# Patient Record
Sex: FEMALE | Race: WHITE | NOT HISPANIC OR LATINO
[De-identification: names, ages, dates, MRNs, and addresses within clinical notes are randomized per-mention and may not be internally consistent; named-entity substitution may affect disease eponyms.]

---

## 2019-05-30 ENCOUNTER — TRANSCRIPTION ENCOUNTER (OUTPATIENT)
Age: 24
End: 2019-05-30

## 2019-06-23 PROBLEM — Z00.00 ENCOUNTER FOR PREVENTIVE HEALTH EXAMINATION: Status: ACTIVE | Noted: 2019-06-23

## 2019-07-23 ENCOUNTER — APPOINTMENT (OUTPATIENT)
Dept: ENDOCRINOLOGY | Facility: CLINIC | Age: 24
End: 2019-07-23
Payer: COMMERCIAL

## 2019-07-23 ENCOUNTER — FORM ENCOUNTER (OUTPATIENT)
Age: 24
End: 2019-07-23

## 2019-07-23 VITALS
DIASTOLIC BLOOD PRESSURE: 79 MMHG | BODY MASS INDEX: 28.49 KG/M2 | HEIGHT: 68 IN | SYSTOLIC BLOOD PRESSURE: 136 MMHG | WEIGHT: 188 LBS | HEART RATE: 76 BPM

## 2019-07-23 DIAGNOSIS — Z80.3 FAMILY HISTORY OF MALIGNANT NEOPLASM OF BREAST: ICD-10-CM

## 2019-07-23 DIAGNOSIS — E04.1 NONTOXIC SINGLE THYROID NODULE: ICD-10-CM

## 2019-07-23 PROCEDURE — 99204 OFFICE O/P NEW MOD 45 MIN: CPT

## 2019-07-24 ENCOUNTER — APPOINTMENT (OUTPATIENT)
Dept: ULTRASOUND IMAGING | Facility: HOSPITAL | Age: 24
End: 2019-07-24
Payer: COMMERCIAL

## 2019-07-24 ENCOUNTER — OUTPATIENT (OUTPATIENT)
Dept: OUTPATIENT SERVICES | Facility: HOSPITAL | Age: 24
LOS: 1 days | End: 2019-07-24
Payer: COMMERCIAL

## 2019-07-24 PROCEDURE — 76536 US EXAM OF HEAD AND NECK: CPT | Mod: 26

## 2019-07-24 PROCEDURE — 76536 US EXAM OF HEAD AND NECK: CPT

## 2019-07-25 LAB
THYROGLOB AB SERPL-ACNC: <20 IU/ML
THYROPEROXIDASE AB SERPL IA-ACNC: 12.7 IU/ML
TSH SERPL-ACNC: 2.26 UIU/ML

## 2019-08-07 ENCOUNTER — FORM ENCOUNTER (OUTPATIENT)
Age: 24
End: 2019-08-07

## 2019-08-08 ENCOUNTER — APPOINTMENT (OUTPATIENT)
Dept: ULTRASOUND IMAGING | Facility: HOSPITAL | Age: 24
End: 2019-08-08
Payer: COMMERCIAL

## 2019-08-08 ENCOUNTER — OUTPATIENT (OUTPATIENT)
Dept: OUTPATIENT SERVICES | Facility: HOSPITAL | Age: 24
LOS: 1 days | End: 2019-08-08
Payer: COMMERCIAL

## 2019-08-08 ENCOUNTER — RESULT REVIEW (OUTPATIENT)
Age: 24
End: 2019-08-08

## 2019-08-08 PROCEDURE — 88173 CYTOPATH EVAL FNA REPORT: CPT

## 2019-08-08 PROCEDURE — 88305 TISSUE EXAM BY PATHOLOGIST: CPT

## 2019-08-08 PROCEDURE — 10005 FNA BX W/US GDN 1ST LES: CPT

## 2019-08-09 LAB — NON-GYNECOLOGICAL CYTOLOGY STUDY: SIGNIFICANT CHANGE UP

## 2019-08-10 NOTE — ASSESSMENT
[FreeTextEntry1] : Thyroid nodule. She is clinically euthyroid. Recent thyroid ultrasound demonstrated a left upper pole 3.5 x 2.1 x 2.8 cm complex cystic nodule meeting criteria for biopsy, although the nodule is smaller on physical examination today. We discussed that approximately 95 percent of all thyroid nodules are caused by benign conditions. We will check thyroid stimulating hormone and thyroid antibodies today. We discussed the procedure for fine needle aspiration of thyroid nodules and possible results, including further testing for atypia of undetermined significance. She will call after biopsy to discuss results.

## 2019-08-10 NOTE — HISTORY OF PRESENT ILLNESS
[FreeTextEntry1] : Ms. Riojas is a 24 year-old woman with no significant past medical history presenting for evaluation of left thyroid nodule.\par \par Left thyroid nodule.\par She noted left neck swelling and pain in May 2019. She went to urgent care and was referred for thyroid ultrasound. She was found to have a left upper pole 3.4 x 2.1 x 2.8 cm complex cystic nodule. She did not have a biopsy of this nodule. Her swelling and pain subsequently improved.\par She again had left neck swelling and pain at the beginning of the month. She was seen in an emergency department in [place] went she was visiting family. Blood tests were unremarkable per report. CT neck was performed showing a left complex cystic nodule on review of images; no report available.\par No history of radiation exposure.\par No family history of thyroid disease.\par \par No dysphagia, fixed/hard neck mass, shortness of breath. No change in weight, palpitations, diarrhea/constipation, hair/skin changes, depression/anxiety. Menses are regular.

## 2019-08-10 NOTE — DATA REVIEWED
[FreeTextEntry1] : Summarized as per HPI.\par \par Thyroid ultrasound (May 31, 2019) reviewed and significant for:\par The right lobe measures 4.5 x 1.2 x 1.7 cm. The left lobe measures 4.7 x 2.1 x 2.6 cm. The isthmus measures 0.2 cm. Right lower pole 0.4 x 0.3 x 0.4 cm cyst. Complex cystic left upper pole 3.4 x 2.1 x 2.8 cm nodule.

## 2019-08-10 NOTE — PHYSICAL EXAM
[Alert] : alert [No Acute Distress] : no acute distress [Healthy Appearance] : healthy appearance [Normal Sclera/Conjunctiva] : normal sclera/conjunctiva [Normal Oropharynx] : the oropharynx was normal [No Neck Mass] : no neck mass was observed [Supple] : the neck was supple [No LAD] : no lymphadenopathy [Normal Rate and Effort] : normal respiratory rhythm and effort [Clear to Auscultation] : lungs were clear to auscultation bilaterally [Normal Rate] : heart rate was normal  [Normal S1, S2] : normal S1 and S2 [Regular Rhythm] : with a regular rhythm [No Stigmata of Cushings Syndrome] : no stigmata of cushings syndrome [Normal Gait] : normal gait [Normal Insight/Judgement] : insight and judgment were intact [Acanthosis Nigricans] : no acanthosis nigricans [Kyphosis] : no kyphosis present [de-identified] : approximately 1.5 cm left upper pole nodule, mobile, rubbery; no other nodules appreciated [de-identified] : no moon facies, no supraclavicular fat pads

## 2019-08-10 NOTE — ADDENDUM
[FreeTextEntry1] : Recent laboratory tests as below. She is biochemically euthyroid with normal thyroid antibodies. Ms. Riojas had come in to the office requesting an order for thyroid ultrasound yesterday even though she was previously given an order for fine needle aspiration. Thyroid ultrasound demonstrated a left upper pole complex 2.9 x 1.0 x 2.0 cm nodule without suspicious features meeting criteria for biopsy due to size. There is a radiology appointment scheduled for August 8th, which I presume is for biopsy. I left a message for call back to discuss results and plan. 7/25/19\par \par Discussed results with Ms. Riojas. She is scheduled for upcoming biopsy and we will discuss results when available. 7/29/19\par \par Cytopathology from recent biopsy [place] II (benign). I left a message for Ms. Riojas and will send a copy of the report for her records. 8/10/19